# Patient Record
Sex: MALE | URBAN - METROPOLITAN AREA
[De-identification: names, ages, dates, MRNs, and addresses within clinical notes are randomized per-mention and may not be internally consistent; named-entity substitution may affect disease eponyms.]

---

## 2020-05-01 ENCOUNTER — APPOINTMENT (RX ONLY)
Dept: URBAN - METROPOLITAN AREA CLINIC 377 | Facility: CLINIC | Age: 45
Setting detail: DERMATOLOGY
End: 2020-05-01

## 2020-05-01 DIAGNOSIS — L20.89 OTHER ATOPIC DERMATITIS: ICD-10-CM

## 2020-05-01 PROCEDURE — ? PRESCRIPTION

## 2020-05-01 PROCEDURE — ? MEDICATION COUNSELING

## 2020-05-01 PROCEDURE — ? ADDITIONAL NOTES

## 2020-05-01 PROCEDURE — 99203 OFFICE O/P NEW LOW 30 MIN: CPT | Mod: 95

## 2020-05-01 PROCEDURE — ? TREATMENT REGIMEN

## 2020-05-01 PROCEDURE — ? COUNSELING

## 2020-05-01 RX ORDER — TRIAMCINOLONE ACETONIDE 1 MG/G
OINTMENT TOPICAL
Qty: 1 | Refills: 1 | Status: ERX | COMMUNITY
Start: 2020-05-01

## 2020-05-01 RX ORDER — HALOBETASOL PROPIONATE 0.5 MG/G
CREAM TOPICAL
Qty: 4 | Refills: 3 | Status: ERX | COMMUNITY
Start: 2020-05-01

## 2020-05-01 RX ADMIN — HALOBETASOL PROPIONATE: 0.5 CREAM TOPICAL at 00:00

## 2020-05-01 RX ADMIN — TRIAMCINOLONE ACETONIDE: 1 OINTMENT TOPICAL at 00:00

## 2020-05-01 ASSESSMENT — BSA ECZEMA: % BODY COVERED IN ECZEMA: 55

## 2020-05-01 ASSESSMENT — SEVERITY ASSESSMENT 2020: SEVERITY 2020: MODERATE

## 2020-05-01 NOTE — HPI: RASH (ATOPIC DERMATITIS)
How Severe Is Your Atopic Dermatitis?: severe
Is This A New Presentation, Or A Follow-Up?: Follow Up Atopic Dermatitis
Additional History: Having atopic dermatitis flare on entire body.

## 2020-05-01 NOTE — PROCEDURE: TREATMENT REGIMEN
Plan: Discussed Dupixent briefly, will recheck in 3 weeks in office and initiate medication if appropriate at that time. Patient understands and agrees.
Detail Level: Zone
Initiate Treatment: Doxycycline 100mg PO BID with food to eradicate impetigo on lower legs\\nTriamcinolone ointment to AA body BID x 2 weeks on 1 week off\\nHalobetasol cream to AA stubborn eczematous patches on body BID x 2 weeks then stop.\\nHydroxyzine 25mg PO QPM prn itch - caution on sedation given to patient.
Otc Regimen: Patient advised wife to use ALL free and clear laundry detergent, Dove sensitive skin body wash and hypoallergenic, fragrance free moisturizers and lotions.

## 2022-05-23 NOTE — PROCEDURE: MEDICATION COUNSELING
Called mother to schedule follow up appointment with Dr. Knowles. Scheduled for 8/24 at 10AM. Mother verbalized understanding.    Imiquimod Counseling:  I discussed with the patient the risks of imiquimod including but not limited to erythema, scaling, itching, weeping, crusting, and pain.  Patient understands that the inflammatory response to imiquimod is variable from person to person and was educated regarded proper titration schedule.  If flu-like symptoms develop, patient knows to discontinue the medication and contact us.